# Patient Record
Sex: MALE | Race: WHITE | NOT HISPANIC OR LATINO | URBAN - METROPOLITAN AREA
[De-identification: names, ages, dates, MRNs, and addresses within clinical notes are randomized per-mention and may not be internally consistent; named-entity substitution may affect disease eponyms.]

---

## 2020-02-21 ENCOUNTER — EMERGENCY (EMERGENCY)
Facility: HOSPITAL | Age: 40
LOS: 1 days | Discharge: HOME | End: 2020-02-21
Attending: EMERGENCY MEDICINE | Admitting: EMERGENCY MEDICINE
Payer: COMMERCIAL

## 2020-02-21 VITALS
WEIGHT: 220.46 LBS | HEART RATE: 64 BPM | DIASTOLIC BLOOD PRESSURE: 103 MMHG | TEMPERATURE: 99 F | SYSTOLIC BLOOD PRESSURE: 160 MMHG | RESPIRATION RATE: 16 BRPM | HEIGHT: 68 IN | OXYGEN SATURATION: 97 %

## 2020-02-21 PROCEDURE — 99283 EMERGENCY DEPT VISIT LOW MDM: CPT

## 2020-02-21 RX ORDER — SACCHAROMYCES BOULARDII 250 MG
1 POWDER IN PACKET (EA) ORAL
Qty: 30 | Refills: 1
Start: 2020-02-21 | End: 2020-04-20

## 2020-02-21 RX ADMIN — Medication 300 MILLIGRAM(S): at 19:44

## 2020-02-21 NOTE — ED ADULT TRIAGE NOTE - CHIEF COMPLAINT QUOTE
sent in by urgent care for eval of left 1st and second toe infection after having "Wart" removed by dermatologist 1 week ago denies fever or chills

## 2020-02-21 NOTE — ED PROVIDER NOTE - PATIENT PORTAL LINK FT
You can access the FollowMyHealth Patient Portal offered by [facility] by registering at the following website: [email or web address]. By joining [facility]’s FollowMyHealth portal, you will also be able to view your health information using other applications (apps) compatible with our system.

## 2020-02-21 NOTE — ED PROVIDER NOTE - NSFOLLOWUPINSTRUCTIONS_ED_ALL_ED_FT
Please do the dressing changes daily as discussed. Use the Silvadene cream that was provided.     Please set up a follow up a the [facility] for early next week.  Address: [address] [place] [address]  Phone: [phone]    Return for signs of infection: redness, swelling, pus, increased pain or fever.

## 2020-02-21 NOTE — ED PROVIDER NOTE - CLINICAL SUMMARY MEDICAL DECISION MAKING FREE TEXT BOX
Patient presenting with blister to toe- looks like equivalent of 2nd degree burn. Dressed, will need blister debrided in a few days. Will add Clinda and rec fu at [facility]. He can also come back for dressing changes prn.

## 2020-02-21 NOTE — ED PROVIDER NOTE - SKIN, MLM
Black tissue at medial L great toe, no blister, no signs of infection. Medial 2nd toe has a large blood/serum filled oozing blister along the entire medial side. No circumferential No signs of cellulitis...

## 2020-02-21 NOTE — ED PROVIDER NOTE - PROGRESS NOTE DETAILS
Blister gently decompressed fo serous fluid, re-dressed with Silvadene, Telfa and sterile gauze. Patient counselled  on dressing changes and need for fu with [facility].

## 2020-02-21 NOTE — ED PROVIDER NOTE - OBJECTIVE STATEMENT
40 M presenting with painand blistering to the medial L 2nd toe. He had two warts burned off with liquid nitrogen at his Dermatologist's office lat week. The second toe blistered the full length of the toe and then popped today He is already on a cephalosporin. [place] [facility] for eval.
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
## 2020-02-26 DIAGNOSIS — S90.425A BLISTER (NONTHERMAL), LEFT LESSER TOE(S), INITIAL ENCOUNTER: ICD-10-CM
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
## 2020-02-26 DIAGNOSIS — Z79.2 LONG TERM (CURRENT) USE OF ANTIBIOTICS: ICD-10-CM
